# Patient Record
Sex: FEMALE | URBAN - METROPOLITAN AREA
[De-identification: names, ages, dates, MRNs, and addresses within clinical notes are randomized per-mention and may not be internally consistent; named-entity substitution may affect disease eponyms.]

---

## 2023-04-20 ENCOUNTER — TRANSCRIBE ORDER (OUTPATIENT)
Dept: SCHEDULING | Age: 43
End: 2023-04-20

## 2023-04-20 DIAGNOSIS — N80.9 ENDOMETRIOTIC CYST: Primary | ICD-10-CM

## 2023-04-23 DIAGNOSIS — N80.9 ENDOMETRIOTIC CYST: Primary | ICD-10-CM

## 2023-05-02 ENCOUNTER — HOSPITAL ENCOUNTER (OUTPATIENT)
Dept: ULTRASOUND IMAGING | Age: 43
Discharge: HOME | End: 2023-05-02
Attending: NURSE PRACTITIONER

## 2023-05-02 DIAGNOSIS — N80.9 ENDOMETRIOTIC CYST: ICD-10-CM

## 2023-05-02 PROCEDURE — 76856 US EXAM PELVIC COMPLETE: CPT

## 2023-05-02 PROCEDURE — 76830 TRANSVAGINAL US NON-OB: CPT

## 2023-09-06 ENCOUNTER — HOSPITAL ENCOUNTER (OUTPATIENT)
Facility: HOSPITAL | Age: 43
Discharge: HOME | End: 2023-09-09
Attending: FAMILY MEDICINE

## 2023-09-06 ENCOUNTER — OFFICE VISIT (OUTPATIENT)
Age: 43
End: 2023-09-06

## 2023-09-06 DIAGNOSIS — Z12.31 VISIT FOR SCREENING MAMMOGRAM: ICD-10-CM

## 2023-09-06 DIAGNOSIS — Z12.31 SCREENING MAMMOGRAM FOR BREAST CANCER: Primary | ICD-10-CM

## 2023-09-06 PROCEDURE — 77063 BREAST TOMOSYNTHESIS BI: CPT

## 2023-09-06 RX ORDER — MIRTAZAPINE 15 MG/1
15 TABLET, FILM COATED ORAL
COMMUNITY
Start: 2023-08-03

## 2023-09-06 RX ORDER — CLONAZEPAM 1 MG/1
TABLET ORAL
COMMUNITY
Start: 2023-08-03

## 2023-09-06 NOTE — PROGRESS NOTES
[facility] H[facility]ORY [facility]STIONNAIRE       No Yes Comments   Has a doctor ever seen or felt anything wrong with your breast? [x]                                  []                                     Have you ever had a breast biopsy? []                                  [x]                                  More than 5 years ago in [place] on one breast/can't remember which side but was benign         When and where was last mammogram performed? February 2022 in [place]    Have you ever been told that there was a problem on your mammogram?   No Yes Comments   [x]                                  []                                       Do you have breast implants? No Yes Comments   [x]                                  []                                       When was your last Pap test performed? [facility] 5/22/2023. Declined [facility] pap services today. Have you ever had an abnormal Pap test?   No Yes Comments   [x]                                  []                                       Have you had a hysterectomy? No Yes Comments (why)   [x]                                  []                                       Have you been through menopause? No Yes Date of LMP   [x]                                  []                                  6 months ago/ history of severe endometriosis. One ovary removed due to this. Has failed IVF treatments/ can not get pregnant. She is medicine from [place] called Visanne 2 mg/dienogest to suppress her periods. Did your mother take EMILY? No Yes Unknown   []                                  []                                  x     Do you have a history of HIV exposure?   No Yes    [x]                                  []                                       Have you ever been diagnosed with any type of Cancer   No Yes Comments (type,when,where,type of treatment   [x]                                  []

## 2023-09-06 NOTE — PROGRESS NOTES
[address] [place] [address] Sadia Pacheco is a 39 y.o. female   Chief Complaint   Patient presents with    Annual Exam     [facility] [employer] [facility]           ASSESSMENT AND PLAN:    1. Screening mammogram for breast cancer  CBE WNL today; proceed with screening mammogram.  Return annually for screening unless otherwise indicated. SUBJECTIVE:    HPI:  Rita Mosher is a 39 y.o. female who presents to [facility] for breast cancer screening. She denies breast pain, lumps, nipple discharge, nipple inversion and overlying skin changes. He mom was dx with breast cancer at 51yo. Pt has severe endometriosis, s/p oopherectomy and is on dienogest for volume and symptom reduction and reports resultant amenorrhea. Review of Systems   Constitutional: Negative. Respiratory: Negative. Cardiovascular: Negative. Gastrointestinal: Negative. Genitourinary:  Negative for dyspareunia, dysuria, genital sores, pelvic pain, vaginal discharge and vaginal pain. Physical Exam  Constitutional:       General: She is not in acute distress. Appearance: Normal appearance. Pulmonary:      Effort: Pulmonary effort is normal.   Chest:   Breasts:     Right: Normal. No inverted nipple, mass, nipple discharge, skin change or tenderness. Left: Normal. No inverted nipple, mass, nipple discharge, skin change or tenderness. Lymphadenopathy:      Upper Body:      Right upper body: No supraclavicular, axillary or pectoral adenopathy. Left upper body: No supraclavicular, axillary or pectoral adenopathy. Neurological:      Mental Status: She is alert and oriented to person, place, and time.

## 2023-09-13 ENCOUNTER — TELEPHONE (OUTPATIENT)
Age: 43
End: 2023-09-13

## 2023-09-13 NOTE — TELEPHONE ENCOUNTER
MALLORY (International Breast Cancer Intervation Study) Online tyrer-brayan model breast cancer risk evaluation tool resulted in 22.95 %. I am mailing patient a letter offering her consult with [facility] to discuss her high risk status.  Lorenzo Archibald RN

## 2023-11-07 ENCOUNTER — TELEPHONE (OUTPATIENT)
Age: 43
End: 2023-11-07

## 2023-11-07 NOTE — TELEPHONE ENCOUNTER
Patients receive bill for ewl  Date of bill 10/25/2023  Date EWL appointment 9/6/2023  Due date is 11/22/2023

## 2023-11-08 NOTE — TELEPHONE ENCOUNTER
Patient called the main office number and left voicemail that she received a bill from [facility] for $068 for  DOS 9/6/2023. Chart reviewed- pt should not have received bill for E[facility] clinic visit. Will send message to associate working on dropping this charges today. Called patient to let her know that she is not responsible for payment. She did not answer, left message that I was returning the phone call and that she could call me back.  Dwight Morillo RN

## 2023-11-09 NOTE — TELEPHONE ENCOUNTER
Patient called back to our [address] [place] [address] [facility] main office-  chart reviewed discussed that the $189 dollar charges will be written off and that she is not responsible for payment. Pt states that she is aware that she is high risk for breast cancer due to her family hx of breast cancer. Patient is interested in seeing high risk provider NP at [facility] and possible screening breast MRI- discussed that genetic testing is not covered. Pt would like to move forward with scheduling appt. For consultation for high risk at [facility] pt is available on Mondays/Saturday only prefers [facility], pt ask  to leave a voicemail in case she is not able to answer.

## 2023-11-20 ENCOUNTER — OFFICE VISIT (OUTPATIENT)
Age: 43
End: 2023-11-20

## 2023-11-20 VITALS — WEIGHT: 140 LBS | BODY MASS INDEX: 21.97 KG/M2 | HEIGHT: 67 IN

## 2023-11-20 DIAGNOSIS — Z80.3 FAMILY HISTORY OF BREAST CANCER: Primary | ICD-10-CM

## 2023-11-20 PROCEDURE — 99203 OFFICE O/P NEW LOW 30 MIN: CPT | Performed by: NURSE PRACTITIONER

## 2023-11-25 ENCOUNTER — HOSPITAL ENCOUNTER (EMERGENCY)
Facility: HOSPITAL | Age: 43
Discharge: HOME | End: 2023-11-25
Attending: EMERGENCY MEDICINE

## 2023-11-25 VITALS
OXYGEN SATURATION: 100 % | HEART RATE: 75 BPM | SYSTOLIC BLOOD PRESSURE: 112 MMHG | RESPIRATION RATE: 18 BRPM | DIASTOLIC BLOOD PRESSURE: 67 MMHG | TEMPERATURE: 98.1 F

## 2023-11-25 DIAGNOSIS — H10.9 CONJUNCTIVITIS OF BOTH EYES, UNSPECIFIED CONJUNCTIVITIS TYPE: Primary | ICD-10-CM

## 2023-11-25 PROCEDURE — 99283 EMERGENCY DEPT VISIT LOW MDM: CPT

## 2023-11-25 RX ORDER — ERYTHROMYCIN 5 MG/G
OINTMENT OPHTHALMIC
Qty: 1 G | Refills: 0 | Status: SHIPPED | OUTPATIENT
Start: 2023-11-25 | End: 2023-12-05

## 2023-11-25 RX ORDER — BENZONATATE 200 MG/1
200 CAPSULE ORAL 3 TIMES DAILY PRN
Qty: 30 CAPSULE | Refills: 0 | Status: SHIPPED | OUTPATIENT
Start: 2023-11-25

## 2023-11-25 RX ORDER — PSEUDOEPHEDRINE HCL 120 MG/1
120 TABLET, FILM COATED, EXTENDED RELEASE ORAL EVERY 12 HOURS PRN
Qty: 14 TABLET | Refills: 1 | Status: SHIPPED | OUTPATIENT
Start: 2023-11-25

## 2023-11-25 ASSESSMENT — ENCOUNTER SYMPTOMS
SHORTNESS OF BREATH: 0
EYE REDNESS: 1
EYE PAIN: 1
ABDOMINAL PAIN: 0
EYE DISCHARGE: 1

## 2023-11-26 NOTE — ED TRIAGE NOTES
Patient arrived ambulatory w/ c/o eye discomfort/drainage/redness/itching bilaterally starting this morning. Patient states eyes were crusted over and difficulty seeing.

## 2023-11-26 NOTE — ED PROVIDER NOTES
[facility] EMERGENCY DEP  EMERGENCY DEPARTMENT ENCOUNTER      Pt Name: [facility]  MRN: [id number]  [address] 1980  Date of evaluation: 11/25/2023  Provider: Andreina Segal PA-C    CHIEF COMPLAINT       Chief Complaint   Patient presents with    Ear Drainage         HISTORY OF PRESENT ILLNESS    HPI  Patient is a 39 y.o. female who presents today with complaints of bilateral eye discomfort, discharge, redness. Has had upper respiratory symptoms recently. No fever, sore throat, difficulty swallowing. Does not wear contacts, does wear glasses. No change in visual acuity. Nursing Notes were reviewed. REVIEW OF SYSTEMS       Review of Systems   Constitutional:  Negative for fever. HENT:  Negative for congestion. Eyes:  Positive for pain, discharge and redness. Respiratory:  Negative for shortness of breath. Cardiovascular:  Negative for chest pain. Gastrointestinal:  Negative for abdominal pain. Genitourinary:  Negative for dysuria. Skin:  Negative for wound. Neurological:  Negative for seizures. Psychiatric/Behavioral:  Negative for suicidal ideas. Except as noted above the remainder of the review of systems was reviewed and negative. PAST MEDICAL HISTORY   No past medical history on file. SURGICAL HISTORY     No past surgical history on file. CURRENT MEDICATIONS       Discharge Medication List as of 11/25/2023 10:26 PM        CONTINUE these medications which have NOT CHANGED    Details   clonazePAM (KLONOPIN) 1 MG tablet TAKE 1/2 TABLET BY MOUTH EVERY MORNING AND 1 TAB AT BEDTIMEHistorical Med      mirtazapine (REMERON) 15 MG tablet Take 1 tablet by mouth nightlyHistorical Med               ALLERGIES     Patient has no known allergies.       FAMILY HISTORY       Family History   Problem Relation Age of Onset    Breast Cancer Mother 54          SOCIAL HISTORY       Social History     Socioeconomic History    Marital status:    Tobacco

## 2024-01-03 ENCOUNTER — TELEPHONE (OUTPATIENT)
Age: 44
End: 2024-01-03

## 2024-01-03 NOTE — TELEPHONE ENCOUNTER
Patient was seen by [facility] on 9/6/2023- charges were added erroneously for the provider's visit. I had already requested on 11/8/2023 for charges to be voided. Unfortunately, charges were not voided and patient has continue to received bills for $189. Patient calling about this - she has received a final noticed and is concerned about this. A new message to the associate assisting in voiding charges has been sent along with a copy of the message from 11/8/2023. Jackeline Moreno RN    01/03/24 2:44 PM  I have received message back that charge has been voided. Hospital account reviewed and shows $0. Patient was called back and told that she should not see an outstanding amount for DOS of 9/6/2023. Patient thankful for assistance. Jackeline Moreno RN

## 2024-07-09 ENCOUNTER — TELEPHONE (OUTPATIENT)
Age: 44
End: 2024-07-09

## 2024-07-09 NOTE — TELEPHONE ENCOUNTER
Patient calling ID [phone], to let us know that she will not longer be participating in the [employer] [facility] program as she now have insurance. Patient thankful for assistance provided. Pt has [employer] central scheduling phone number, recommended that she call her insurance company to make sure [employer] is in network. Jackeline Moreno RN

## 2025-07-15 ENCOUNTER — TRANSCRIBE ORDERS (OUTPATIENT)
Facility: HOSPITAL | Age: 45
End: 2025-07-15

## 2025-07-15 DIAGNOSIS — Z91.89 OTHER SPECIFIED PERSONAL RISK FACTORS, NOT ELSEWHERE CLASSIFIED: Primary | ICD-10-CM
